# Patient Record
Sex: FEMALE | Race: WHITE | NOT HISPANIC OR LATINO | Employment: UNEMPLOYED | URBAN - METROPOLITAN AREA
[De-identification: names, ages, dates, MRNs, and addresses within clinical notes are randomized per-mention and may not be internally consistent; named-entity substitution may affect disease eponyms.]

---

## 2019-09-09 ENCOUNTER — HOSPITAL ENCOUNTER (EMERGENCY)
Facility: HOSPITAL | Age: 24
Discharge: HOME | End: 2019-09-09
Attending: EMERGENCY MEDICINE | Admitting: EMERGENCY MEDICINE

## 2019-09-09 ENCOUNTER — APPOINTMENT (OUTPATIENT)
Dept: GENERAL RADIOLOGY | Facility: HOSPITAL | Age: 24
End: 2019-09-09

## 2019-09-09 VITALS
TEMPERATURE: 98.4 F | BODY MASS INDEX: 16.83 KG/M2 | HEART RATE: 89 BPM | WEIGHT: 101 LBS | RESPIRATION RATE: 16 BRPM | DIASTOLIC BLOOD PRESSURE: 76 MMHG | HEIGHT: 65 IN | OXYGEN SATURATION: 100 % | SYSTOLIC BLOOD PRESSURE: 120 MMHG

## 2019-09-09 DIAGNOSIS — F41.0 ANXIETY ATTACK: Primary | ICD-10-CM

## 2019-09-09 LAB — S PYO AG THROAT QL: NEGATIVE

## 2019-09-09 PROCEDURE — 99284 EMERGENCY DEPT VISIT MOD MDM: CPT

## 2019-09-09 PROCEDURE — 96372 THER/PROPH/DIAG INJ SC/IM: CPT

## 2019-09-09 PROCEDURE — 93010 ELECTROCARDIOGRAM REPORT: CPT | Performed by: INTERNAL MEDICINE

## 2019-09-09 PROCEDURE — 93005 ELECTROCARDIOGRAM TRACING: CPT | Performed by: EMERGENCY MEDICINE

## 2019-09-09 PROCEDURE — 87880 STREP A ASSAY W/OPTIC: CPT | Performed by: EMERGENCY MEDICINE

## 2019-09-09 PROCEDURE — 99282 EMERGENCY DEPT VISIT SF MDM: CPT | Performed by: EMERGENCY MEDICINE

## 2019-09-09 PROCEDURE — 25010000002 LORAZEPAM PER 2 MG: Performed by: EMERGENCY MEDICINE

## 2019-09-09 PROCEDURE — 71046 X-RAY EXAM CHEST 2 VIEWS: CPT

## 2019-09-09 PROCEDURE — 87081 CULTURE SCREEN ONLY: CPT | Performed by: EMERGENCY MEDICINE

## 2019-09-09 RX ORDER — LORAZEPAM 2 MG/ML
1 INJECTION INTRAMUSCULAR ONCE
Status: COMPLETED | OUTPATIENT
Start: 2019-09-09 | End: 2019-09-09

## 2019-09-09 RX ORDER — ALPRAZOLAM 0.25 MG/1
0.5 TABLET ORAL ONCE
Status: DISCONTINUED | OUTPATIENT
Start: 2019-09-09 | End: 2019-09-09

## 2019-09-09 RX ORDER — PANTOPRAZOLE SODIUM 40 MG/1
40 TABLET, DELAYED RELEASE ORAL DAILY
COMMUNITY
End: 2022-08-04

## 2019-09-09 RX ORDER — DIVALPROEX SODIUM 125 MG/1
125 TABLET, DELAYED RELEASE ORAL 2 TIMES DAILY
COMMUNITY
End: 2022-08-04 | Stop reason: HOSPADM

## 2019-09-09 RX ORDER — LEVALBUTEROL TARTRATE 45 UG/1
1-2 AEROSOL, METERED ORAL EVERY 4 HOURS PRN
COMMUNITY
End: 2022-08-04 | Stop reason: SDUPTHER

## 2019-09-09 RX ORDER — LANSOPRAZOLE 15 MG/1
15 CAPSULE, DELAYED RELEASE ORAL DAILY
COMMUNITY
End: 2022-08-04 | Stop reason: DRUGHIGH

## 2019-09-09 RX ORDER — DESVENLAFAXINE SUCCINATE 50 MG/1
100 TABLET, EXTENDED RELEASE ORAL DAILY
COMMUNITY
End: 2022-08-04 | Stop reason: ALTCHOICE

## 2019-09-09 RX ADMIN — LORAZEPAM 1 MG: 2 INJECTION INTRAMUSCULAR; INTRAVENOUS at 10:56

## 2019-09-09 NOTE — ED NOTES
Pt requesting malox informed Dr. Natarajan pt request. No new orders noted     Hodan Greco, KUMAR  09/09/19 7007

## 2019-09-09 NOTE — ED PROVIDER NOTES
Subjective   24-year-old female with history of anxiety and panic disorder presents with chest pain difficulty taking a deep breath feels like stuff stuck in her throat since last night.  Is only able to take benzos for her anxiety.  States she is allergic to everything else.  Also having a sore throat.        History provided by:  Patient  Panic Attack   Presents for initial visit. Onset was in the past 7 days. Symptoms include chest pain, depressed mood, dizziness, excessive worry, feeling of choking, hyperventilation, insomnia, nervous/anxious behavior, panic, restlessness and shortness of breath. Patient reports no suicidal ideas. The most recent episode lasted 12 hours. The severity of symptoms is severe. Nothing aggravates the symptoms.     Her past medical history is significant for anxiety/panic attacks.       Review of Systems   Constitutional: Positive for appetite change.   HENT: Positive for sore throat and trouble swallowing.    Respiratory: Positive for shortness of breath.    Cardiovascular: Positive for chest pain.   Musculoskeletal: Negative.    Neurological: Positive for dizziness.   Psychiatric/Behavioral: Negative for suicidal ideas. The patient is nervous/anxious and has insomnia.        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   • Latex Hives   • Lexapro [Escitalopram Oxalate] Other (See Comments)     Partial paralysis   • Pertussis Vaccines Other  (See Comments)     Pt does not know what happened mother told me allergic   • Prozac [Fluoxetine Hcl] Other (See Comments)     Partial paralysis   • Rhogam [Rho (D) Immune Globulin] Other (See Comments)     tachycardia   • Risperidone Mental Status Change     Shaking, cold, freaks out   • Seroquel [Quetiapine Fumarate] Other (See Comments)     Becomes suicidal and sleeps a lot   • Tramadol Nausea And Vomiting   • Zoloft [Sertraline Hcl] Other (See Comments)     States can't remember might make me throw up   • Albuterol Anxiety     Increase heart rate       Past Surgical History:   Procedure Laterality Date   •  SECTION     • DENTAL PROCEDURE     • EAR TUBES     • TUBAL ABDOMINAL LIGATION      pt not sure       History reviewed. No pertinent family history.    Social History     Socioeconomic History   • Marital status: Single     Spouse name: Not on file   • Number of children: Not on file   • Years of education: Not on file   • Highest education level: Not on file   Tobacco Use   • Smoking status: Current Every Day Smoker     Packs/day: 1.00   Substance and Sexual Activity   • Alcohol use: Yes     Comment: socially   • Drug use: No           Objective   Physical Exam   Constitutional: She is oriented to person, place, and time. She appears well-developed.  Non-toxic appearance. She does not appear ill. She appears distressed.   HENT:   Head: Normocephalic and atraumatic.   Eyes: EOM are normal. Pupils are equal, round, and reactive to light.   Neck: Normal range of motion. Neck supple.   Cardiovascular: Normal rate, regular rhythm and normal pulses.   Pulmonary/Chest: Effort normal and breath sounds normal. She has no decreased breath sounds.   Abdominal: Soft. There is no tenderness.   Neurological: She is alert and oriented to person, place, and time.   Skin: Skin is warm and dry.   Psychiatric: Her mood appears anxious. Her speech is rapid and/or pressured. She is agitated. Cognition and memory are  normal. She expresses impulsivity. She expresses no homicidal and no suicidal ideation.   Nursing note and vitals reviewed.      Procedures           ED Course  ED Course as of Sep 09 1211   Mon Sep 09, 2019   1003 ECG 12 Lead [TP]   1210 Prior to discharge patient was feeling better.  States this is just the worst panic attack she is had a long time.  She is encouraged to follow-up with [facility].  [TP]      ED Course User Index  [TP] Daniel Natarajan MD                  [facility]    Final diagnoses:   Anxiety attack              Daniel Natarajan MD  09/09/19 1211

## 2019-09-11 LAB — BACTERIA SPEC AEROBE CULT: NORMAL

## 2019-09-25 ENCOUNTER — HOSPITAL ENCOUNTER (EMERGENCY)
Facility: HOSPITAL | Age: 24
Discharge: HOME | End: 2019-09-25
Attending: EMERGENCY MEDICINE | Admitting: EMERGENCY MEDICINE

## 2019-09-25 VITALS
HEIGHT: 63 IN | WEIGHT: 101.6 LBS | DIASTOLIC BLOOD PRESSURE: 64 MMHG | OXYGEN SATURATION: 99 % | RESPIRATION RATE: 14 BRPM | TEMPERATURE: 98 F | BODY MASS INDEX: 18 KG/M2 | HEART RATE: 110 BPM | SYSTOLIC BLOOD PRESSURE: 127 MMHG

## 2019-09-25 DIAGNOSIS — K02.9 DENTAL CARIES: Primary | ICD-10-CM

## 2019-09-25 DIAGNOSIS — R68.84 JAW PAIN: ICD-10-CM

## 2019-09-25 DIAGNOSIS — J01.00 ACUTE MAXILLARY SINUSITIS, RECURRENCE NOT SPECIFIED: ICD-10-CM

## 2019-09-25 PROCEDURE — 99152 MOD SED SAME PHYS/QHP 5/>YRS: CPT

## 2019-09-25 PROCEDURE — 99282 EMERGENCY DEPT VISIT SF MDM: CPT

## 2019-09-25 PROCEDURE — 99282 EMERGENCY DEPT VISIT SF MDM: CPT | Performed by: EMERGENCY MEDICINE

## 2019-09-25 RX ORDER — HYDROXYZINE HYDROCHLORIDE 25 MG/1
25 TABLET, FILM COATED ORAL EVERY 8 HOURS PRN
Qty: 30 TABLET | Refills: 0 | Status: SHIPPED | OUTPATIENT
Start: 2019-09-25 | End: 2022-08-04 | Stop reason: SDUPTHER

## 2019-09-25 NOTE — ED PROVIDER NOTES
EMERGENCY DEPARTMENT ENCOUNTER    Room Number:    Date seen:  2019  Time seen: 2:01 PM  PCP: Provider, No Known  Historian: Patient      HPI:  Chief Complaint: Left jaw pain, sinus congestion, cough   A complete HPI/ROS/PMH/PSH/SH/FH are unobtainable due to: nothing  Context: Aury Marquis is a 24 y.o. female who presents to the ED c/o multiple complaints.  She reports a history of TMJ disorder and reports she has been having left-sided jaw pain when she opens and closes her jaw.  She also reports she has had sinus congestion and cough.  She denies fever.  She has known poor dentition but has difficulty following up with a dentist due to financial issues and her history of  bleeding disorder.            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 No pertinent family history.      SOCIAL HISTORY  Social History     Socioeconomic History   • Marital status: Single     Spouse name: Not on file   • Number of children: Not on file   • Years of education: Not on file   • Highest education level: Not on file   Tobacco Use   • Smoking status: Current Every Day Smoker     Packs/day: 1.00   Substance and Sexual Activity   • Alcohol use: Yes     Comment: socially   • Drug use: No         ALLERGIES  Amicar [aminocaproic acid];  Buspar [buspirone]; Cephalosporins; Ketamine; Latex; Lexapro [escitalopram oxalate]; Pertussis vaccines; Prozac [fluoxetine hcl]; Rhogam [rho (d) immune globulin]; Risperidone; Seroquel [quetiapine fumarate]; Tramadol; Zoloft [sertraline hcl]; and Albuterol        REVIEW OF SYSTEMS  Review of Systems   Constitutional: Negative for fatigue.   HENT: Positive for congestion and sinus pain.    Respiratory: Positive for cough.    Gastrointestinal: Negative.    Genitourinary: Negative.    Musculoskeletal: Negative.    Skin: Negative.    Neurological: Negative.    Psychiatric/Behavioral: Negative.             PHYSICAL EXAM  ED Triage Vitals [09/25/19 1131]   Temp Heart Rate Resp BP SpO2   98 °F (36.7 °C) 110 14 127/64 99 %      Temp src Heart Rate Source Patient Position BP Location FiO2 (%)   -- -- -- -- --         GENERAL: Awake and alert, no acute distress  HENT: nares patent, mild maxillary sinus tenderness bilaterally, oropharynx is clear, she has generally poor dentition with multiple dental caries and fillings present.  The left mandibular second molar has a large cavity but no surrounding gingival swelling.  There is no sublingual edema.  There is mild point tenderness along the left angle of the mandible with no neck swelling or cervical adenopathy.  EYES: no scleral icterus  CV: regular rhythm, normal rate  RESPIRATORY: normal effort, lungs clear bilaterally, no wheezing  ABDOMEN: soft, nontender throughout  MUSCULOSKELETAL: no deformity  NEURO: alert, moves all extremities, follows commands  SKIN: warm, dry    Vital signs and nursing notes reviewed.              PROCEDURES  Procedures              MEDICATIONS GIVEN IN ER  Medications - No data to display                PROGRESS AND CONSULTS           MEDICAL DECISION MAKING    24-year-old female presents with multiple complaints.  I suspect that her left jaw pain is most likely related to her poor dentition and a dental cavity involving the left mandibular  second molar.  Given her concomitant sinusitis, she was prescribed Augmentin and advised to follow-up with her PCP and a dentist.  She also requested benzodiazepines to help manage her anxiety.  I explained her that I would not be prescribing her benzodiazepines but I offered her hydroxyzine as needed for anxiety.  She was also given Motrin as needed for pain.  Return precautions were discussed.    MDM           DIAGNOSIS  Final diagnoses:   Dental caries   Jaw pain   Acute maxillary sinusitis, recurrence not specified         DISPOSITION  Discharge            Latest Documented Vital Signs:  As of 2:01 PM  BP- 127/64 HR- 110 Temp- 98 °F (36.7 °C) O2 sat- 99%        --    Please note that portions of this were completed with a voice recognition program.          Loco Carson MD  09/25/19 7032

## 2020-04-06 ENCOUNTER — TELEPHONE (OUTPATIENT)
Dept: SURGERY | Facility: CLINIC | Age: 25
End: 2020-04-06

## 2020-05-13 ENCOUNTER — APPOINTMENT (OUTPATIENT)
Dept: LAB | Facility: HOSPITAL | Age: 25
End: 2020-05-13

## 2021-06-23 ENCOUNTER — HOSPITAL ENCOUNTER (EMERGENCY)
Facility: HOSPITAL | Age: 26
Discharge: HOME | End: 2021-06-24
Attending: EMERGENCY MEDICINE | Admitting: EMERGENCY MEDICINE

## 2021-06-23 VITALS
WEIGHT: 99.43 LBS | DIASTOLIC BLOOD PRESSURE: 72 MMHG | OXYGEN SATURATION: 99 % | HEIGHT: 63 IN | HEART RATE: 111 BPM | RESPIRATION RATE: 18 BRPM | BODY MASS INDEX: 17.62 KG/M2 | TEMPERATURE: 97.7 F | SYSTOLIC BLOOD PRESSURE: 119 MMHG

## 2021-06-23 DIAGNOSIS — K04.7 TOOTH INFECTION: ICD-10-CM

## 2021-06-23 DIAGNOSIS — K02.9 DENTAL CARIES: Primary | ICD-10-CM

## 2021-06-23 PROCEDURE — 99282 EMERGENCY DEPT VISIT SF MDM: CPT

## 2021-06-23 RX ORDER — PENICILLIN V POTASSIUM 500 MG/1
500 TABLET ORAL 4 TIMES DAILY
Qty: 40 TABLET | Refills: 0 | Status: SHIPPED | OUTPATIENT
Start: 2021-06-23 | End: 2021-07-03

## 2021-06-23 RX ORDER — KETOROLAC TROMETHAMINE 30 MG/ML
30 INJECTION, SOLUTION INTRAMUSCULAR; INTRAVENOUS ONCE
Status: COMPLETED | OUTPATIENT
Start: 2021-06-23 | End: 2021-06-24

## 2021-06-24 PROCEDURE — 96372 THER/PROPH/DIAG INJ SC/IM: CPT

## 2021-06-24 PROCEDURE — 25010000002 KETOROLAC TROMETHAMINE PER 15 MG: Performed by: PHYSICIAN ASSISTANT

## 2021-06-24 RX ADMIN — KETOROLAC TROMETHAMINE 30 MG: 30 INJECTION, SOLUTION INTRAMUSCULAR at 00:18

## 2022-08-04 ENCOUNTER — OFFICE VISIT (OUTPATIENT)
Dept: FAMILY MEDICINE CLINIC | Facility: CLINIC | Age: 27
End: 2022-08-04

## 2022-08-04 VITALS
BODY MASS INDEX: 18.89 KG/M2 | WEIGHT: 106.6 LBS | HEIGHT: 63 IN | DIASTOLIC BLOOD PRESSURE: 70 MMHG | HEART RATE: 86 BPM | SYSTOLIC BLOOD PRESSURE: 108 MMHG | OXYGEN SATURATION: 99 %

## 2022-08-04 DIAGNOSIS — M54.41 CHRONIC BILATERAL LOW BACK PAIN WITH BILATERAL SCIATICA: ICD-10-CM

## 2022-08-04 DIAGNOSIS — M54.42 CHRONIC BILATERAL LOW BACK PAIN WITH BILATERAL SCIATICA: ICD-10-CM

## 2022-08-04 DIAGNOSIS — G89.29 CHRONIC BILATERAL LOW BACK PAIN WITH BILATERAL SCIATICA: ICD-10-CM

## 2022-08-04 DIAGNOSIS — F41.9 ANXIETY: Primary | ICD-10-CM

## 2022-08-04 DIAGNOSIS — M51.36 DDD (DEGENERATIVE DISC DISEASE), LUMBAR: ICD-10-CM

## 2022-08-04 DIAGNOSIS — J06.9 ACUTE URI: ICD-10-CM

## 2022-08-04 DIAGNOSIS — F32.A DEPRESSION, UNSPECIFIED DEPRESSION TYPE: ICD-10-CM

## 2022-08-04 PROCEDURE — 99204 OFFICE O/P NEW MOD 45 MIN: CPT | Performed by: FAMILY MEDICINE

## 2022-08-04 RX ORDER — AMOXICILLIN 500 MG/1
500 CAPSULE ORAL 3 TIMES DAILY
COMMUNITY
Start: 2022-07-28 | End: 2022-11-07

## 2022-08-04 RX ORDER — VENLAFAXINE 37.5 MG/1
37.5 TABLET ORAL 2 TIMES DAILY
Qty: 60 TABLET | Refills: 0 | Status: SHIPPED | OUTPATIENT
Start: 2022-08-04

## 2022-08-04 RX ORDER — HYDROXYZINE HYDROCHLORIDE 25 MG/1
25 TABLET, FILM COATED ORAL EVERY 8 HOURS PRN
Qty: 30 TABLET | Refills: 0 | Status: SHIPPED | OUTPATIENT
Start: 2022-08-04 | End: 2022-09-13 | Stop reason: ALTCHOICE

## 2022-08-04 RX ORDER — LANSOPRAZOLE 30 MG/1
30 CAPSULE, DELAYED RELEASE ORAL DAILY
Qty: 90 CAPSULE | Refills: 3 | Status: SHIPPED | OUTPATIENT
Start: 2022-08-04

## 2022-08-04 RX ORDER — LORATADINE 10 MG/1
10 TABLET, ORALLY DISINTEGRATING ORAL DAILY
Qty: 90 TABLET | Refills: 3 | Status: SHIPPED | OUTPATIENT
Start: 2022-08-04

## 2022-08-04 RX ORDER — BENZONATATE 100 MG/1
100 CAPSULE ORAL 3 TIMES DAILY PRN
Qty: 30 CAPSULE | Refills: 0 | Status: SHIPPED | OUTPATIENT
Start: 2022-08-04 | End: 2022-08-14

## 2022-08-04 RX ORDER — LEVALBUTEROL TARTRATE 45 UG/1
1-2 AEROSOL, METERED ORAL EVERY 4 HOURS PRN
Qty: 15 G | Refills: 11 | Status: SHIPPED | OUTPATIENT
Start: 2022-08-04 | End: 2022-09-13 | Stop reason: SDUPTHER

## 2022-08-04 RX ORDER — DIVALPROEX SODIUM 125 MG/1
125 CAPSULE, COATED PELLETS ORAL 2 TIMES DAILY
Qty: 60 CAPSULE | Refills: 0 | Status: SHIPPED | OUTPATIENT
Start: 2022-08-04 | End: 2022-10-30 | Stop reason: SDUPTHER

## 2022-08-04 NOTE — PROGRESS NOTES
New Patient Office Visit      Patient Name: Aury Marquis  : 1995   MRN: [id number]     Chief Complaint:    Chief Complaint   Patient presents with   • Establish Care   • Anxiety       Subjective   History of Present Illness    History of Present Illness: Aury Marquis is a 27 y.o. female who is here today to establish care.    Previous primary care: Dr. Maikel Garcia, [place]    Chronic health conditions:  Anxiety: Currently buying Xanax off the street to treat anxiety. Was seeing therapist/psychiatrist at [facility] up until ~2021.  Choking phobia: ~Since age 4, has been worked up and did speech therapy previously  Patient reports being on benzodiazepines since the age of 4. She has not seen a psychiatrist since last , so she has been buying alprazolam on the streets. She is currently out of alprazolam. She reports previously being prescribed clonazepam, valproic acid, and paroxetine. She also notes history of taking does in the fact seen, which was the most helpful medication. However, she can no longer take this medication because she cannot swallow the pill. Patient says group therapy has been beneficial in the past, but individual therapy has not. She has not regularly seen a therapist in several months, but did have an intake appointment with Melchor Bingham in [facility] recently. Patient does not have another appointment scheduled, due to transportation issues. Patient would like to be admitted to [facility] for treatment of anxiety via medications in group therapy. She notes she specifically presented to [facility], because she saw online that we could accommodate her dietary needs.  Alcohol use disorder: Drinking ~fifth per day self-medicating    The patient is accompanied by an adult male.    The patient is a 27-year-old female who presents today to establish care. She has numerous primary care providers listed on her chart including Dr. Paulino Ko and SREEKANTH Del Castillo,  "and she appears to have an appointment with Marco A Lam D.O. at [facility] on 09/01/2022.    The patient reports that her anxiety is \"out of control.\" She is no longer taking her medications. The last physician that she saw was Dr. Ko and he had prescribed her clonazepam. She reports that she would talk to him every 2 weeks, and he would send her medication up here. She claims that he never informed her that she had to do blood work. She reports that she was told that if she did not come in for bloodwork that day, he would no longer prescribe the medication.    The patient reports that she has had a cough for the past 2 weeks for which she uses NyQuil. She states that she was put on amoxicillin, which she is still administering.     The patient reports that she has a swallowing phobia. She reports that most of the food that she eats, she does not swallow. She reports that her anxiety has gotten to the point where a couple of weeks ago she was not eating anything or drinking even water, as she was choking on water, and she has been experiencing this since she was approximately 4 years old. She reports that she has had swallow studies in the past, and she has seen a speech therapist. She informs us that every time something traumatic happens, \"her body just loses it.\" Recently she lost 2 grandmothers in 01/2022 and 03/2022. She reports that she visits the emergency room often for her panic attacks. She reports that every time she goes to the emergency room, they give her Xanax, and it calms her down for a couple of hours. She reports that she has been breaking the Xanax into quarters so that she can make it through the day. She reports that she drinks alcohol way too much, and she does not want to drink alcohol. The adult male reports that some days she might drink a whole fifth plus a 6-pack, and some days she might drink 4 tall boys. The patient reports that she was never an alcoholic. She reports " that she was perfectly fine when she had the clonazepam. She reports that she used to be on Depakote and Pristiq when she could swallow a tablet; however, she is allergic to Pristiq. She reports that she can do liquid medication and medication that she can crush into applesauce. The patient reports that she was administering Suboxone 2 years ago. The last time the patient used pain medication was approximately 1 month ago for her teeth. She claims that all of her teeth need to be removed.    The patient reports that she has scoliosis, which causes her significant pain. The patient's last MRI was in 03/2021. She did have a lumbar spine CT performed in 05/2022.    The patient also claims to have gastroesophageal reflux which is under control. She had been taking multiple medications for her gastroesophageal reflux, and Prevacid is the only one that worked.    The patient is requesting dissolvable Claritin. She does use Xopenex inhaler. She claims that she cannot take albuterol because it makes her jittery.    The patient reports that approximately 4 weeks ago, her fingernails were yellow. She said that they went back to normal now that she is eating again.    Other physicians currently involved in patient's care:  Patient Care Team:  Crescencio Haines DO as PCP - General (Family Medicine)  Provider, No Known as PCP - Family Medicine  Beck Ybarra APRN as Referring Physician (Family Medicine)  Johnson Burton MD PhD as Consulting Physician (Hematology and Oncology)      This patient is accompanied by their self who contributes to the history of their care.    The following portions of the patient's history were reviewed and updated as appropriate: allergies, current medications, past family history, past medical history, past social history, past surgical history and problem list.    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    • Central nervous system dysfunction in     • Depression    • GERD (gastroesophageal reflux disease)    • Gum disease    • Panic disorder    • Scoliosis    • Tattoos    • Von Willebrand disease (HCC)    • Von Willebrand disease (HCC)        Past Surgical History:   Past Surgical History:   Procedure Laterality Date   •  SECTION     • DENTAL PROCEDURE     • EAR TUBES     • TUBAL ABDOMINAL LIGATION      pt not sure       Family History:   Family History   Problem Relation Age of Onset   • Hypertension Mother    • Stroke Mother    • Cancer Maternal Grandfather    • Ovarian cancer Paternal Grandmother    • Hypertension Paternal Grandfather        Social History:   Social History     Socioeconomic History   • Marital status: Single   • Number of children: 3   • Years of education: High school   Tobacco Use   • Smoking status: Current Every Day Smoker     Packs/day: 1.00     Years: 6.00     Pack years: 6.00     Types: Cigarettes   • Smokeless tobacco: Never Used   Substance and Sexual Activity   • Alcohol use: Yes     Comment: daily   • Drug use: No   • Sexual activity: Defer       Tobacco History:   Social History     Tobacco Use   Smoking Status Current Every Day Smoker   • Packs/day: 1.00   • Years: 6.   • Pack years: 6.00   • Types: Cigarettes   Smokeless Tobacco Never Used       Medications:     Current Outpatient Medications:   •  amoxicillin (AMOXIL) 500 MG capsule, Take 500 mg by mouth 3 (Three) Times a Day., Disp: , Rfl:   •  hydrOXYzine (ATARAX) 25 MG tablet, Take 1 tablet by mouth Every 8 (Eight) Hours As Needed for Anxiety., Disp: 30 tablet, Rfl: 0  •  ibuprofen (ADVIL,MOTRIN) 100 MG/5ML suspension, Take 20 mL by mouth Every 6 (Six) Hours As Needed for Mild Pain ., Disp: 200 mL, Rfl: 0  •  levalbuterol (XOPENEX HFA)  "45 MCG/ACT inhaler, Inhale 1-2 puffs Every 4 (Four) Hours As Needed for Wheezing., Disp: 15 g, Rfl: 11  •  benzonatate (TESSALON) 100 MG capsule, Take 1 capsule by mouth 3 (Three) Times a Day As Needed for Cough for up to 10 days., Disp: 30 capsule, Rfl: 0  •  Divalproex Sodium (Depakote Sprinkles) 125 MG capsule, Take 1 capsule by mouth 2 (Two) Times a Day., Disp: 60 capsule, Rfl: 0  •  lansoprazole (Prevacid) 30 MG capsule, Take 1 capsule by mouth Daily., Disp: 90 capsule, Rfl: 3  •  loratadine (Claritin Reditabs) 10 MG disintegrating tablet, Place 1 tablet on the tongue Daily., Disp: 90 tablet, Rfl: 3  •  venlafaxine (EFFEXOR) 37.5 MG tablet, Take 1 tablet by mouth 2 (Two) Times a Day., Disp: 60 tablet, Rfl: 0    Allergies:   Allergies   Allergen Reactions   • Contrast Dye Shortness Of Breath   • Amicar [Aminocaproic Acid] Nausea And Vomiting     Happened as a baby, mother states I'm allergic   • Buspar [Buspirone] Hallucinations   • Cephalosporins Hives   • Ketamine Other (See Comments)     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   • Latex Hives   • Lexapro [Escitalopram Oxalate] Other (See Comments)     Partial paralysis   • Morphine Itching   • Pertussis Vaccines Other (See Comments)     Pt does not know what happened mother told me allergic   • Prozac [Fluoxetine Hcl] Other (See Comments)     Partial paralysis   • Rhogam [Rho (D) Immune Globulin] Other (See Comments)     tachycardia   • Risperidone Mental Status Change     Shaking, cold, freaks out   • Seroquel [Quetiapine Fumarate] Other (See Comments)     Becomes suicidal and sleeps a lot   • Tramadol Nausea And Vomiting   • Zoloft [Sertraline Hcl] Other (See Comments)     States can't remember might make me throw up   • Albuterol Anxiety     Increase heart rate       Objective   Objective     Physical Exam:  Vital Signs:   Vitals:    08/04/22 1601   BP: 108/70   Pulse: 86   SpO2: 99%   Weight: 48.4 kg (106 lb 9.6 oz)   Height: 160 cm (62.99\") "     Body mass index is 18.89 kg/m².     Physical Exam  Nursing note reviewed  Const: NAD, A&Ox4, Pleasant, Cooperative  Eyes: EOMI, no conjunctivitis  ENT: No nasal discharge present, neck supple  Cardiac: Regular rate and rhythm, no cyanosis  Resp: Respiratory rate within normal limits, no increased work of breathing, no audible wheezing or retractions noted  GI: No distention or ascites  MSK: Motor and sensation grossly intact in bilateral upper extremities  Neurologic: CN II-XII grossly intact  Psych: Appropriate mood and behavior.  Skin: Warm, dry  Procedures/Radiology     Procedures  No radiology results for the last 7 days     Assessment & Plan   Assessment / Plan      Assessment/Plan:   Problems Addressed This Visit  Diagnoses and all orders for this visit:    1. Anxiety (Primary)  -     Ambulatory Referral to Psychiatry  -     venlafaxine (EFFEXOR) 37.5 MG tablet; Take 1 tablet by mouth 2 (Two) Times a Day.  Dispense: 60 tablet; Refill: 0  -     Divalproex Sodium (Depakote Sprinkles) 125 MG capsule; Take 1 capsule by mouth 2 (Two) Times a Day.  Dispense: 60 capsule; Refill: 0  -     hydrOXYzine (ATARAX) 25 MG tablet; Take 1 tablet by mouth Every 8 (Eight) Hours As Needed for Anxiety.  Dispense: 30 tablet; Refill: 0    2. Acute URI    3. Depression, unspecified depression type  -     venlafaxine (EFFEXOR) 37.5 MG tablet; Take 1 tablet by mouth 2 (Two) Times a Day.  Dispense: 60 tablet; Refill: 0  -     Divalproex Sodium (Depakote Sprinkles) 125 MG capsule; Take 1 capsule by mouth 2 (Two) Times a Day.  Dispense: 60 capsule; Refill: 0    4. Chronic bilateral low back pain with bilateral sciatica  -     Ambulatory Referral to Pain Management    5. DDD (degenerative disc disease), lumbar  -     Ambulatory Referral to Pain Management    Other orders  -     ibuprofen (ADVIL,MOTRIN) 100 MG/5ML suspension; Take 20 mL by mouth Every 6 (Six) Hours As Needed for Mild Pain .  Dispense: 200 mL; Refill: 0  -      lansoprazole (Prevacid) 30 MG capsule; Take 1 capsule by mouth Daily.  Dispense: 90 capsule; Refill: 3  -     loratadine (Claritin Reditabs) 10 MG disintegrating tablet; Place 1 tablet on the tongue Daily.  Dispense: 90 tablet; Refill: 3  -     levalbuterol (XOPENEX HFA) 45 MCG/ACT inhaler; Inhale 1-2 puffs Every 4 (Four) Hours As Needed for Wheezing.  Dispense: 15 g; Refill: 11  -     benzonatate (TESSALON) 100 MG capsule; Take 1 capsule by mouth 3 (Three) Times a Day As Needed for Cough for up to 10 days.  Dispense: 30 capsule; Refill: 0    1. Anxiety  - Severe, with multiple phobias and panic attacks. She was previously on clonazepam and currently buying Xanax off the street and self-medicating with a 5th of alcohol per day. Obviously, I recommended she avoid illicit use of benzodiazepines and reduce her alcohol use slowly over the next few weeks. I will get her back on an anxiety medication that she is able to crush (the Pristiq was unable to be crushed or opened, so we will put her on venlafaxine 37.5 mg twice daily) along with Depakote sprinkles. I have placed a referral to psychiatry in the meantime.    2. Asthma.  - She cannot tolerate albuterol due to panic attacks. I recommend Xopenex prescription sent. She should also continue Claritin daily.    3. Swallowing phobia.  - She has previously been worked up and went to speech therapy. It seems to be certainly worsened by her anxiety and going untreated.    4. Chronic low back pain.  - She has severe scoliosis and low back pain. MRI from 03/2021 was fairly benign with a very slight disc herniation at S1, but open foramina and mild, at most degenerative disc disease. Her pain is remarked to be certainly out of proportion to MRI findings, and with her history of multiple substance abuse, I would certainly not feel comfortable prescribing controlled substances. She requested a referral to pain management, and this was provided for her today.  Problem List Items  Addressed This Visit        Mental Health    Anxiety - Primary    Relevant Medications    venlafaxine (EFFEXOR) 37.5 MG tablet    Divalproex Sodium (Depakote Sprinkles) 125 MG capsule    hydrOXYzine (ATARAX) 25 MG tablet    Other Relevant Orders    Ambulatory Referral to Psychiatry (Completed)      Other Visit Diagnoses     Acute URI        Relevant Medications    amoxicillin (AMOXIL) 500 MG capsule    Depression, unspecified depression type        Relevant Medications    venlafaxine (EFFEXOR) 37.5 MG tablet    Divalproex Sodium (Depakote Sprinkles) 125 MG capsule    hydrOXYzine (ATARAX) 25 MG tablet    Chronic bilateral low back pain with bilateral sciatica        Relevant Orders    Ambulatory Referral to Pain Management (Completed)    DDD (degenerative disc disease), lumbar        Relevant Orders    Ambulatory Referral to Pain Management (Completed)          We will plan to obtain previous records both for chronic preventative care as well as those related to the current episode of care.  Any records that the patient brought with her today were reviewed personally by me during the visit today and will be scanned into the chart for posterity.    Discussed the nature of the disease including relevant anatomy & expected clinical course, risks, complications, implications, management, safe and proper use of medications. Plan of care reviewed with patient at the conclusion of today's visit. Education was provided regarding diagnosis and management.  Patient verbalizes understanding of and agreement with management plan. Encouraged therapeutic lifestyle changes including low calorie diet with plenty of fruits and vegetables, daily exercise, medication compliance, and keeping scheduled follow up appointments with me and any other providers. Encouraged patient to have appointment for complete physical, fasting labs, appropriate screenings, and immunizations on an annual basis. Discussed extended office hours, shared call,  and appropriate use of the ER. Discussed generally we do not prescribe chronic controlled substances from this office. Appropriate referrals will be made to pain management and psychiatry if needed. Stressed the importance and expectation of medical compliance with plan of care, medications, and follow up appointments.    There are no Patient Instructions on file for this visit.    Follow Up:   Return in about 4 weeks (around 9/1/2022).    DO RELL Feliz  [address]  [facility]  [address]  [facility] [address]  Fax [phone]  Phone [phone]    Transcribed from ambient dictation for Crescencio Haines DO by LISBETH GOOD.  08/04/22   19:01 EDT    Patient verbalized consent to the visit recording.  I have personally performed the services described in this document as transcribed by the above individual, and it is both accurate and complete.  Crescencio Haines DO  8/5/2022  12:31 EDT

## 2022-08-08 ENCOUNTER — TELEPHONE (OUTPATIENT)
Dept: FAMILY MEDICINE CLINIC | Facility: CLINIC | Age: 27
End: 2022-08-08

## 2022-08-08 ENCOUNTER — PRIOR AUTHORIZATION (OUTPATIENT)
Dept: FAMILY MEDICINE CLINIC | Facility: CLINIC | Age: 27
End: 2022-08-08

## 2022-08-08 NOTE — TELEPHONE ENCOUNTER
Caller: Aury Marquis    Relationship to patient: Self    Best call back number: [phone]    Patient is needing: PATIENT CALLED AND STATED THAT THERE ARE 2 PRESCRIPTIONS THAT SEEMED TO HAVE BEEN FORGOTTEN TO BE CALLED IN. CLARITIN DISSOLVABLE TABLETS AND XOPENEX INHALER. PATIENT ALSO REQUESTS A LARGER QUANTITY PRESCRIPTION OF IBUPROFEN IF POSSIBLE.

## 2022-08-08 NOTE — TELEPHONE ENCOUNTER
Completed Prior Auth for Loratadine     KEY:  [id number]  Awaiting determination from [employer].

## 2022-08-08 NOTE — TELEPHONE ENCOUNTER
Rx have been received, inhaler and claritin require PA. This has been submitted. Patient notified.

## 2022-08-12 ENCOUNTER — TELEPHONE (OUTPATIENT)
Dept: FAMILY MEDICINE CLINIC | Facility: CLINIC | Age: 27
End: 2022-08-12

## 2022-08-12 DIAGNOSIS — F32.A DEPRESSION, UNSPECIFIED DEPRESSION TYPE: ICD-10-CM

## 2022-08-12 DIAGNOSIS — F41.9 ANXIETY: Primary | ICD-10-CM

## 2022-08-12 RX ORDER — LEVALBUTEROL TARTRATE 45 UG/1
1-2 AEROSOL, METERED ORAL EVERY 4 HOURS PRN
Qty: 15 G | Refills: 11 | OUTPATIENT
Start: 2022-08-12

## 2022-08-12 RX ORDER — LORATADINE 10 MG/1
10 TABLET, ORALLY DISINTEGRATING ORAL DAILY
Qty: 90 TABLET | Refills: 3 | OUTPATIENT
Start: 2022-08-12

## 2022-08-12 NOTE — TELEPHONE ENCOUNTER
Caller: MEGHA MICHAEL    Relationship: Emergency Contact    Best call back number: [phone]    What is the medical concern/diagnosis: NERVE MEDICATION    What specialty or service is being requested: [facility]    PATIENT IS REQUESTING REFERRAL, THEY HAVE APPOINTMENT NEXT MONTH  09/13/22 AT NOON

## 2022-08-12 NOTE — TELEPHONE ENCOUNTER
Caller: MEGHA MICHAEL    Relationship: Emergency Contact    Best call back number: [phone] ([phone]    Requested Prescriptions:   Requested Prescriptions     Pending Prescriptions Disp Refills   • loratadine (Claritin Reditabs) 10 MG disintegrating tablet 90 tablet 3     Sig: Place 1 tablet on the tongue Daily.   • levalbuterol (XOPENEX HFA) 45 MCG/ACT inhaler 15 g 11     Sig: Inhale 1-2 puffs Every 4 (Four) Hours As Needed for Wheezing.        Pharmacy where request should be sent: [facility] [address] [place] [address] [phone] [facility] [phone]      Additional details provided by patient: PHARMACY STATED THEY DO NOT HAVE THOSE PRESCRIPTIONS- PATIENT HAS TO HAVE THE NAME BRAND XOPENEX DUE TO SIDE EFFECTS.     ALSO PATIENT IS REQUESTING LIQUID Z-PACK DUE TO GREEN MUCUS, LIQUID PREDNISONE DUE TO COUGH AND CHEST CONGESTION AND DISSOLVABLE  ZOFRAN FOR NAUSEA     Does the patient have less than a 3 day supply:  [x] Yes  [] No    Anna Frederick Rep   08/12/22 10:08 EDT

## 2022-08-12 NOTE — TELEPHONE ENCOUNTER
Caller: MEGHA MICHAEL    Relationship: Emergency Contact    Best call back number [phone] (M)    PATIENT AND HER  CALLED TO CHECK THE STATUS OF THE REFERRAL [facility] THAT WAS PLACED ON 08/04/22.     PATIENT IS HAVING EXTREME ANXIETY AND DUE TO THE ANXIETY SHE IS UNABLE TO KEEP DOWN FOOD AND LIQUID    PATIENT HAS A CHOCKING PHOBIA     NO ONE HAS CONTACTED HER FROM [facility]

## 2022-08-23 ENCOUNTER — TELEPHONE (OUTPATIENT)
Dept: FAMILY MEDICINE CLINIC | Facility: CLINIC | Age: 27
End: 2022-08-23

## 2022-08-29 PROBLEM — F60.3 BORDERLINE PERSONALITY DISORDER (HCC): Status: ACTIVE | Noted: 2019-12-16

## 2022-08-29 PROBLEM — J30.2 SEASONAL ALLERGIES: Status: ACTIVE | Noted: 2019-12-16

## 2022-08-29 PROBLEM — F13.20 BENZODIAZEPINE DEPENDENCE (HCC): Status: ACTIVE | Noted: 2019-12-16

## 2022-08-29 PROBLEM — F11.20 OPIATE DEPENDENCE, CONTINUOUS: Status: ACTIVE | Noted: 2019-12-16

## 2022-08-29 PROBLEM — K21.9 GASTROESOPHAGEAL REFLUX DISEASE WITHOUT ESOPHAGITIS: Status: ACTIVE | Noted: 2019-12-16

## 2022-08-29 PROBLEM — J45.20 MILD INTERMITTENT ASTHMA WITHOUT COMPLICATION: Status: ACTIVE | Noted: 2021-01-21

## 2022-08-29 PROBLEM — F39 MOOD DISORDER: Status: ACTIVE | Noted: 2019-12-16

## 2022-09-12 ENCOUNTER — TELEPHONE (OUTPATIENT)
Dept: FAMILY MEDICINE CLINIC | Facility: CLINIC | Age: 27
End: 2022-09-12

## 2022-09-12 NOTE — TELEPHONE ENCOUNTER
Hub staff attempted to follow warm transfer process and was unsuccessful     Caller: MEGHA MICHAEL    Relationship to patient: Emergency Contact    Best call back number: [phone]    Patient is needing: THE CALLER STATES THAT THE PATIENT HAS A VIRTUAL APPOINTMENT AT 4:30 PM TODAY AND CAN NOT GET INTO MYCHART PLEASE CALL PATIENT

## 2022-09-13 ENCOUNTER — TELEMEDICINE (OUTPATIENT)
Dept: FAMILY MEDICINE CLINIC | Facility: CLINIC | Age: 27
End: 2022-09-13

## 2022-09-13 DIAGNOSIS — J06.9 ACUTE URI: Primary | ICD-10-CM

## 2022-09-13 DIAGNOSIS — F41.9 ANXIETY: ICD-10-CM

## 2022-09-13 PROCEDURE — 99214 OFFICE O/P EST MOD 30 MIN: CPT | Performed by: FAMILY MEDICINE

## 2022-09-13 RX ORDER — AMOXICILLIN AND CLAVULANATE POTASSIUM 250; 62.5 MG/5ML; MG/5ML
500 POWDER, FOR SUSPENSION ORAL 2 TIMES DAILY
Qty: 200 ML | Refills: 0 | Status: SHIPPED | OUTPATIENT
Start: 2022-09-13 | End: 2022-09-23

## 2022-09-13 RX ORDER — HYDROXYZINE HCL 10 MG/5 ML
50 SOLUTION, ORAL ORAL 3 TIMES DAILY
Qty: 2250 ML | Refills: 11 | Status: SHIPPED | OUTPATIENT
Start: 2022-09-13

## 2022-09-13 RX ORDER — LEVALBUTEROL TARTRATE 45 UG/1
1-2 AEROSOL, METERED ORAL EVERY 4 HOURS PRN
Qty: 15 G | Refills: 11 | Status: SHIPPED | OUTPATIENT
Start: 2022-09-13

## 2022-09-13 NOTE — PROGRESS NOTES
Subjective   Aury Marquis is a 27 y.o. female.     Chief Complaint   Patient presents with   • Follow-up   • URI       History of Present Illness     Aury Marquis presents today for   Chief Complaint   Patient presents with   • Follow-up   • URI       Follow up from  ER for viral URI. Pt reports that she has been sick x2 weeks and has only gotten progressively worse. Pt also notes that she has a new onset of green mucous with the cough in the past few days. Hx of asthma. Pt also notes chest tightness from the coughing, dehydration, loss of appetite, and shortness of breath. Pt also endorses N/V/D.   Patient denies fever, chills, congestion, body aches, dizziness, light headedness, urinary symptoms, changes in bowels, SOA, and chest pain.    Took a course of azithromycin. Not any better. Jaw is swollen from dental issues.    Saw Wild at [facility] regarding anxiety.    You have chosen to receive care through a telehealth visit.  Do you consent to use a video/audio connection for your medical care today? Yes    The following portions of the patient's history were reviewed and updated as appropriate: allergies, current medications, past family history, past medical history, past social history, past surgical history and problem list.    Active Ambulatory Problems     Diagnosis Date Noted   • Anxiety 08/04/2022   • Seasonal allergies 12/16/2019   • Post traumatic stress disorder (PTSD) 08/02/2016   • Generalized anxiety disorder 12/11/2014   • Opiate dependence, continuous ([facility]) 12/16/2019   • Mood disorder ([facility]) 12/16/2019   • Mild intermittent asthma without complication 01/21/2021   • Gastroesophageal reflux disease without esophagitis 12/16/2019   • Borderline personality disorder ([facility]) 12/16/2019   • Von Willebrand disease ([facility]) 12/11/2014   • Benzodiazepine dependence ([facility]) 12/16/2019   • Asthma 12/11/2014   • Agoraphobia with panic attacks 12/11/2014     Resolved Ambulatory Problems     Diagnosis Date  Noted   • No Resolved Ambulatory Problems     Past Medical History:   Diagnosis Date   • Adjustment reaction    • 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    • Central nervous system dysfunction in     • Depression    • GERD (gastroesophageal reflux disease)    • Gum disease    • Panic disorder    • Scoliosis    • Tattoos      Past Surgical History:   Procedure Laterality Date   •  SECTION     • DENTAL PROCEDURE     • EAR TUBES     • TUBAL ABDOMINAL LIGATION      pt not sure     Family History   Problem Relation Age of Onset   • Hypertension Mother    • Stroke Mother    • Cancer Maternal Grandfather    • Ovarian cancer Paternal Grandmother    • Hypertension Paternal Grandfather      Social History     Socioeconomic History   • Marital status: Single   • Number of children: 3   • Years of education: High school   Tobacco Use   • Smoking status: Current Every Day Smoker     Packs/day: 1.00     Years: 6.00     Pack years: 6.00     Types: Cigarettes   • Smokeless tobacco: Never Used   Substance and Sexual Activity   • Alcohol use: Yes     Comment: daily   • Drug use: No   • Sexual activity: Defer       Review of Systems  Review of Systems -  General ROS: negative for - chills, fever or night sweats  Cardiovascular ROS: no chest pain or dyspnea on exertion  Gastrointestinal ROS: no abdominal pain, change in bowel habits, or black or bloody stools  Genito-Urinary ROS: no dysuria, trouble voiding, or hematuria    Objective   There were no vitals taken for this visit.  Vitals obtained from patient if available  Physical Exam  Const: Non-toxic appearing, NAD, A&Ox4, Pleasant, Cooperative  Eyes: EOMI, no conjunctivitis  ENT: No copious nasal drainage noted  Cardiac: Regular rate by pulse  Resp: Respiratory rate observed to be within normal limits, no increased work of breathing observed, no audible wheezing or cough noted  Psych: Appropriate mood and behavior.  Procedures  Assessment &  Plan   Problem List Items Addressed This Visit        Mental Health    Anxiety    Relevant Medications    hydrOXYzine (ATARAX) 10 MG/5ML syrup    Cariprazine HCl (Vraylar) 3 MG capsule capsule      Other Visit Diagnoses     Acute URI    -  Primary          See patient diagnoses and orders along with patient instructions for assessment, plan, and changes to care for patient.    This visit was conducted via telemedicine with live video and audio provided through Video Options: MyChart/Zoom at the point of care.    There are no Patient Instructions on file for this visit.    No follow-ups on file.    Ambulatory progress note signed and attested to by Crescencio Haines D.O.

## 2022-09-30 ENCOUNTER — PATIENT MESSAGE (OUTPATIENT)
Dept: FAMILY MEDICINE CLINIC | Facility: CLINIC | Age: 27
End: 2022-09-30

## 2022-10-18 ENCOUNTER — TELEPHONE (OUTPATIENT)
Dept: FAMILY MEDICINE CLINIC | Facility: CLINIC | Age: 27
End: 2022-10-18

## 2022-10-18 NOTE — TELEPHONE ENCOUNTER
Caller: MEGHA PAT    Relationship: Spouse    Best call back number: [phone] OR MOTHER WILBERT ROCHA [phone]    What is the best time to reach you: ANY TIME    Who are you requesting to speak with (clinical staff, provider,  specific staff member): DR CONTRERAS    Do you know the name of the person who called: MEGHA    What was the call regarding: PATIENT IS IN THE [facility] AND THEY NEED DR CONTRERAS TO CALL THEM TO GIVE THEM WHAT MEDICATIONS PATIENT IS ON AND NEEDING . PLEASE CALL [phone] THEY NEED TO HEAR FROM DR CONTRERAS ASA. ANA NAME IS DARLINE MARTINEZ.  Do you require a callback: YES

## 2022-10-18 NOTE — TELEPHONE ENCOUNTER
Father requested that we contact the intermediate nurse to discuss current medications, and explain her agoraphobia and inability to swallow pills.     Contacted the longterm center, the  & nurse have left for the evening. Will call back in the morning, when they return

## 2022-10-19 NOTE — TELEPHONE ENCOUNTER
Spoke with facility nurse to discuss patient's medical conditions. She requested a copy of current meds, and allergy list faxed to their office.       [facility]  [phone] fax

## 2022-10-24 NOTE — TELEPHONE ENCOUNTER
Rx Refill Note  Requested Prescriptions     Pending Prescriptions Disp Refills   • ibuprofen (ADVIL,MOTRIN) 100 MG/5ML suspension 200 mL 0     Sig: Take 20 mL by mouth Every 6 (Six) Hours As Needed for Mild Pain.      Last office visit with prescribing clinician: 8/4/2022      Next office visit with prescribing clinician: Visit date not found            Renae Garrison MA  10/24/22, 14:41 EDT

## 2022-10-24 NOTE — TELEPHONE ENCOUNTER
Incoming Refill Request      Medication requested (name and dose): ibuprofen (ADVIL,MOTRIN) 100 MG/5ML suspension    Pharm[facility] where request should be sent[facility]    Additional details provided by patient: PATIENT IS OUT OF THE MEDICATION    Best call back number: [phone]    Does the patient have less than a 3 day supply:  [x] Yes  [] No    ALSO, WOULD LIKE TO SEE IF SHE COULD GET A Z-PACK AS HER TEETH ARE INFECTED.  SHE MADE AN APPOINTMENT FOR 11/5/22 TO GET HER MEDICATIONS REFILLED AND MENTAL HEALTH ISSUES AS Saturday WAS THE ONLY TIME SHE COULD COME IN.    Anna Andrade Rep  10/24/22, 14:25 EDT

## 2022-10-25 NOTE — TELEPHONE ENCOUNTER
Rx Refill Note  Requested Prescriptions     Signed Prescriptions Disp Refills   • ibuprofen (ADVIL,MOTRIN) 100 MG/5ML suspension 200 mL 0     Sig: Take 20 mL by mouth Every 6 (Six) Hours As Needed for Mild Pain.     Authorizing Provider: STAR CONTRERAS     Ordering User: AISHWARYA CALDERÓN      Last office visit with prescribing clinician: 9/132022      Next office visit with prescribing clinician: 11/05/22           Aishwarya Calderón MA  10/25/22, 14:16 EDT

## 2022-10-30 DIAGNOSIS — F32.A DEPRESSION, UNSPECIFIED DEPRESSION TYPE: ICD-10-CM

## 2022-10-30 DIAGNOSIS — F41.9 ANXIETY: ICD-10-CM

## 2022-10-31 RX ORDER — DIVALPROEX SODIUM 125 MG/1
125 CAPSULE, COATED PELLETS ORAL 2 TIMES DAILY
Qty: 60 CAPSULE | Refills: 0 | Status: SHIPPED | OUTPATIENT
Start: 2022-10-31

## 2022-10-31 RX ORDER — LORATADINE 10 MG/1
10 TABLET, ORALLY DISINTEGRATING ORAL DAILY
Qty: 90 TABLET | Refills: 3 | OUTPATIENT
Start: 2022-10-31

## 2022-11-07 ENCOUNTER — TELEMEDICINE (OUTPATIENT)
Dept: FAMILY MEDICINE CLINIC | Facility: CLINIC | Age: 27
End: 2022-11-07

## 2022-11-07 DIAGNOSIS — J04.0 LARYNGITIS: ICD-10-CM

## 2022-11-07 DIAGNOSIS — J06.9 UPPER RESPIRATORY TRACT INFECTION, UNSPECIFIED TYPE: Primary | ICD-10-CM

## 2022-11-07 DIAGNOSIS — N76.0 ACUTE VAGINITIS: ICD-10-CM

## 2022-11-07 PROCEDURE — 99213 OFFICE O/P EST LOW 20 MIN: CPT | Performed by: PHYSICIAN ASSISTANT

## 2022-11-07 RX ORDER — GUAIFENESIN 200 MG/10ML
400 LIQUID ORAL 3 TIMES DAILY PRN
Qty: 236 ML | Refills: 0 | Status: SHIPPED | OUTPATIENT
Start: 2022-11-07

## 2022-11-07 RX ORDER — DEXTROMETHORPHAN HYDROBROMIDE AND PROMETHAZINE HYDROCHLORIDE 15; 6.25 MG/5ML; MG/5ML
5 SYRUP ORAL 4 TIMES DAILY PRN
Qty: 180 ML | Refills: 0 | Status: SHIPPED | OUTPATIENT
Start: 2022-11-07

## 2022-11-07 NOTE — PROGRESS NOTES
CC  Flu or strep    HPI     Aury Marquis is a pleasant 27 y.o. female with a PMH significant for asthma who presents for a video visit. You have chosen to receive care through a telehealth visit.    Do you consent to use a video/audio connection for your medical care today? Yes.    Patient location: home.   Provider location: [facility] office.    The patient c/o 2-3 day history of cough and losing her voice. She has associated runny nose, yellow-green mucous. Usually symptoms go into bronchitis. She denies wheezing. She is always short of breath due to anxiety but this has not worsened. She has needed to use her rescue inhaler more since her symptoms started. She was recently exposed to her mother who was diagnosed with a viral illness; she tested negative for strep, influenza, and COVID.     She also reports several other complaints including vaginal itching for 3 weeks. She does admit to new sexual partners and does not have a gynecologist.     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    • Central nervous system dysfunction in     • Depression    • GERD (gastroesophageal reflux disease)    • Gum disease    • Panic disorder    • Scoliosis    • Tattoos    • Von Willebrand disease (HCC)    • Von Willebrand disease (HCC)        Past Surgical History:   Procedure Laterality Date   •  SECTION     • DENTAL PROCEDURE     • EAR TUBES     • TUBAL ABDOMINAL LIGATION      pt not sure       Family History   Problem Relation Age of Onset   • Hypertension Mother    • Stroke Mother    • Cancer Maternal Grandfather    • Ovarian cancer Paternal Grandmother    • Hypertension Paternal Grandfather        Social History     Socioeconomic History   • Marital status: Single   • Number of children: 3   • Years of education: High school   Tobacco Use   • Smoking status: Every Day     Packs/day: 1.00      Years: 6.00     Pack years: 6.00     Types: Cigarettes   • Smokeless tobacco: Never   Substance and Sexual Activity   • Alcohol use: Yes     Comment: daily   • Drug use: No   • Sexual activity: Defer       Allergies   Allergen Reactions   • Contrast Dye Shortness Of Breath   • Amicar [Aminocaproic Acid] Nausea And Vomiting     Happened as a baby, mother states I'm allergic   • Buspar [Buspirone] Hallucinations   • Cephalosporins Hives   • Ketamine Other (See Comments)     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   • Latex Hives   • Lexapro [Escitalopram Oxalate] Other (See Comments)     Partial paralysis   • Morphine Itching   • Pertussis Vaccines Other (See Comments)     Pt does not know what happened mother told me allergic   • Prozac [Fluoxetine Hcl] Other (See Comments)     Partial paralysis   • Rhogam [Rho (D) Immune Globulin] Other (See Comments)     tachycardia   • Risperidone Mental Status Change     Shaking, cold, freaks out   • Seroquel [Quetiapine Fumarate] Other (See Comments)     Becomes suicidal and sleeps a lot   • Tramadol Nausea And Vomiting   • Zoloft [Sertraline Hcl] Other (See Comments)     States can't remember might make me throw up   • Albuterol Anxiety     Increase heart rate       ROS    Review of Systems   Constitutional: Positive for chills.   HENT: Positive for congestion.    Respiratory: Positive for cough and shortness of breath. Negative for wheezing.    Cardiovascular: Positive for chest pain.   Genitourinary: Positive for vaginal discharge.   Musculoskeletal: Positive for myalgias.   Neurological: Positive for headache.       There were no vitals filed for this visit.  There is no height or weight on file to calculate BMI.      Current Outpatient Medications:   •  Cariprazine HCl (Vraylar) 3 MG capsule capsule, Take 1 capsule by mouth Daily., Disp: 30 capsule, Rfl: 11  •  Divalproex Sodium (Depakote Sprinkles) 125 MG capsule, Take 1 capsule by mouth 2 (Two) Times a Day., Disp:  60 capsule, Rfl: 0  •  guaifenesin (ROBITUSSIN) 100 MG/5ML liquid, Take 20 mL by mouth 3 (Three) Times a Day As Needed for Congestion., Disp: 236 mL, Rfl: 0  •  hydrOXYzine (ATARAX) 10 MG/5ML syrup, Take 25 mL by mouth 3 (Three) Times a Day., Disp: 2250 mL, Rfl: 11  •  ibuprofen (ADVIL,MOTRIN) 100 MG/5ML suspension, Take 20 mL by mouth Every 6 (Six) Hours As Needed for Mild Pain., Disp: 200 mL, Rfl: 0  •  lansoprazole (Prevacid) 30 MG capsule, Take 1 capsule by mouth Daily., Disp: 90 capsule, Rfl: 3  •  levalbuterol (XOPENEX HFA) 45 MCG/ACT inhaler, Inhale 1-2 puffs Every 4 (Four) Hours As Needed for Wheezing., Disp: 15 g, Rfl: 11  •  loratadine (Claritin Reditabs) 10 MG disintegrating tablet, Place 1 tablet on the tongue Daily., Disp: 90 tablet, Rfl: 3  •  PREDNISONE 20MG/ML SUSP, Take 2 mL by mouth Daily for 5 days., Disp: 10 mL, Rfl: 0  •  promethazine-dextromethorphan (PROMETHAZINE-DM) 6.25-15 MG/5ML syrup, Take 5 mL by mouth 4 (Four) Times a Day As Needed for Cough., Disp: 180 mL, Rfl: 0  •  venlafaxine (EFFEXOR) 37.5 MG tablet, Take 1 tablet by mouth 2 (Two) Times a Day., Disp: 60 tablet, Rfl: 0    PE    Physical Exam  Vitals reviewed.   HENT:      Mouth/Throat:      Comments: Voice is hoarse  Pulmonary:      Effort: Pulmonary effort is normal. No respiratory distress.   Neurological:      Mental Status: She is alert.   Psychiatric:         Mood and Affect: Mood normal.          A/P    Problem List Items Addressed This Visit    None  Visit Diagnoses     Upper respiratory tract infection, unspecified type    -  Primary  -Discussed likely viral etiology. The patient requested azithromycin. We discussed that this would not help a viral infection and may cause side-effects and antibiotic resistance.   -Recommended treatment with rx cough syrup, mucinex, and prednisone. She requests liquid form of medications due to a coking phobia. Rx's were sent to her [facility] pharmacy.   -Advised patient to come into the clinic  for evaluation if symptoms worsen or persist for more than 7-10 days.       Laryngitis        Acute vaginitis      -Counseled the patient to schedule an appointment with her PCP in the office for this complaint.  I will refer her to gynecology at her request due to her history of ovarian cyst.    Relevant Orders    Ambulatory Referral to Gynecology          Plan of care was reviewed with patient at the conclusion of today's visit. Education was provided regarding diagnoses, management, prescribed or recommended OTC products, and the importance of compliance with follow-up appointments. The patient was counseled regarding the risks, benefits, and possible side-effects of treatment. I advised the patient to keep me informed of any acute changes in their status including new, worsening, or persistent symptoms. Patient expresses understanding and agreement with the management plan.        EDISON Knapp

## 2022-11-09 ENCOUNTER — TELEPHONE (OUTPATIENT)
Dept: FAMILY MEDICINE CLINIC | Facility: CLINIC | Age: 27
End: 2022-11-09

## 2022-11-09 RX ORDER — PREDNISONE INTENSOL 5 MG/ML
40 SOLUTION, CONCENTRATE ORAL DAILY
Qty: 40 ML | Refills: 0 | Status: SHIPPED | OUTPATIENT
Start: 2022-11-09 | End: 2022-11-09

## 2022-11-09 RX ORDER — PREDNISONE INTENSOL 5 MG/ML
40 SOLUTION, CONCENTRATE ORAL DAILY
Qty: 40 ML | Refills: 0 | Status: SHIPPED | OUTPATIENT
Start: 2022-11-09 | End: 2022-11-14

## 2022-11-11 ENCOUNTER — PRIOR AUTHORIZATION (OUTPATIENT)
Dept: FAMILY MEDICINE CLINIC | Facility: CLINIC | Age: 27
End: 2022-11-11

## 2022-11-11 NOTE — TELEPHONE ENCOUNTER
Received communication that Predisone Concentrate is not covered by pt's plan. Plan prefers prednisone, prednisolone, or prednisolone sodium phosphate.     Change or PA?

## 2022-11-21 ENCOUNTER — PATIENT MESSAGE (OUTPATIENT)
Dept: FAMILY MEDICINE CLINIC | Facility: CLINIC | Age: 27
End: 2022-11-21

## 2022-11-23 ENCOUNTER — OFFICE VISIT (OUTPATIENT)
Dept: OBSTETRICS AND GYNECOLOGY | Facility: CLINIC | Age: 27
End: 2022-11-23

## 2022-11-23 ENCOUNTER — TELEPHONE (OUTPATIENT)
Dept: OBSTETRICS AND GYNECOLOGY | Facility: CLINIC | Age: 27
End: 2022-11-23

## 2022-11-23 VITALS
SYSTOLIC BLOOD PRESSURE: 96 MMHG | DIASTOLIC BLOOD PRESSURE: 60 MMHG | BODY MASS INDEX: 18.71 KG/M2 | HEIGHT: 64 IN | WEIGHT: 109.6 LBS

## 2022-11-23 DIAGNOSIS — N76.0 BV (BACTERIAL VAGINOSIS): ICD-10-CM

## 2022-11-23 DIAGNOSIS — D68.00 VON WILLEBRAND DISEASE: Primary | ICD-10-CM

## 2022-11-23 DIAGNOSIS — N92.6 IRREGULAR PERIODS: ICD-10-CM

## 2022-11-23 DIAGNOSIS — R10.2 PELVIC PAIN: ICD-10-CM

## 2022-11-23 DIAGNOSIS — B96.89 BV (BACTERIAL VAGINOSIS): ICD-10-CM

## 2022-11-23 DIAGNOSIS — Z01.419 WOMEN'S ANNUAL ROUTINE GYNECOLOGICAL EXAMINATION: Primary | ICD-10-CM

## 2022-11-23 PROCEDURE — 3008F BODY MASS INDEX DOCD: CPT | Performed by: OBSTETRICS & GYNECOLOGY

## 2022-11-23 PROCEDURE — 99385 PREV VISIT NEW AGE 18-39: CPT | Performed by: OBSTETRICS & GYNECOLOGY

## 2022-11-23 PROCEDURE — 2014F MENTAL STATUS ASSESS: CPT | Performed by: OBSTETRICS & GYNECOLOGY

## 2022-11-23 RX ORDER — METRONIDAZOLE 7.5 MG/G
1 GEL VAGINAL DAILY
Qty: 70 G | Refills: 1 | Status: SHIPPED | OUTPATIENT
Start: 2022-11-23

## 2022-11-23 RX ORDER — METRONIDAZOLE 7.5 MG/G
GEL VAGINAL DAILY
Qty: 70 G | Refills: 1 | Status: SHIPPED | OUTPATIENT
Start: 2022-11-23 | End: 2022-11-23

## 2022-11-23 NOTE — TELEPHONE ENCOUNTER
PT STATES SHE WAS INSTRUCTED BY DR MCDONOUGH SHE NEEDS TO SEE A HEMATOLOGIST BEFORE SHE CAN HAVE HER PROCEDURE DONE. PT WANTING TO KNOW IF SHE CAN BE COORDINATED WITH A HEMATOLOGIST WITHIN [facility]. PLEASE ADVISE PT, SHE CAN BE REACHED ANYTIME.

## 2022-11-23 NOTE — TELEPHONE ENCOUNTER
Per CBF's note from today. She would need to see hematology for consult prior to surgery, Will place referral so she can stay in [facility]

## 2022-11-23 NOTE — PROGRESS NOTES
Gynecologic Annual Exam Note        Gynecologic Exam (annual)        Subjective     HPI  Aury Marquis is a 27 y.o.  female who presents for annual well woman exam as a new patient. There were no changes to her medical or surgical history since her last visit.. Patient reports problems with: vaginal itching, foul odor, and a thick yellow/green discharge . Patient's last menstrual period was 2022.. Her periods are regular every 25-35 days, lasting 5-6 days. The flow is moderate. Dysmenorrhea:mild, occurring premenstrually and first 1-2 days of flow. Patient reports that she has had BTB for the past month. Partner Status: Marital Status:  and .  She is sexually active. She has not had new partners.. STD testing recommendations have been explained to the patient and she does desire STD testing.    Patient reports that she has a hx of left ovarian cysts. Patient states that she had an TVUS at  and she was told that her cysts had ruptured and that the clip on her left fallopian tube is misplaced.     Additional OB/GYN History   Current contraception: contraceptive methods: Tubal ligation  Desires to: continue contraception  Thromboembolic Disease: personal history-Type I Von WilleBrands Disease  Age of menarche: 13    History of STD: yes HSV    Last Pap : . Results: unknown . HPV: unknown  Patient states that it was abnormal & she was suppose to go back for a repeat, but did not.  Last Completed Pap Smear     This patient has no relevant Health Maintenance data.           History of abnormal Pap smear: yes -   Gardasil status:refuses  Family history of uterine, colon, breast, or ovarian cancer: yes - Ovarian Ca- M & MGM  Performs monthly Self-Breast Exam: yes  Exercises Regularly:yes  Feelings of Anxiety or Depression: yes - pt sees a therapist & is medicated  Tobacco Usage?: Yes Aury Marquis  reports that she has been smoking cigarettes. She has a 4.00 pack-year smoking  history. She has never used smokeless tobacco.. I have educated her on the risk of diseases from using tobacco products such as cancer, COPD and heart disease.     I advised her to quit and she is not willing to quit.    I spent 3  minutes counseling the patient.            Current Outpatient Medications:   •  metroNIDAZOLE (METROGEL VAGINAL) 0.75 % vaginal gel, Insert 1 application into the vagina Daily., Disp: 70 g, Rfl: 1  •  Cariprazine HCl (Vraylar) 3 MG capsule capsule, Take 1 capsule by mouth Daily., Disp: 30 capsule, Rfl: 11  •  Divalproex Sodium (Depakote Sprinkles) 125 MG capsule, Take 1 capsule by mouth 2 (Two) Times a Day., Disp: 60 capsule, Rfl: 0  •  guaifenesin (ROBITUSSIN) 100 MG/5ML liquid, Take 20 mL by mouth 3 (Three) Times a Day As Needed for Congestion., Disp: 236 mL, Rfl: 0  •  hydrOXYzine (ATARAX) 10 MG/5ML syrup, Take 25 mL by mouth 3 (Three) Times a Day., Disp: 2250 mL, Rfl: 11  •  ibuprofen (ADVIL,MOTRIN) 100 MG/5ML suspension, Take 20 mL by mouth Every 6 (Six) Hours As Needed for Mild Pain., Disp: 200 mL, Rfl: 0  •  lansoprazole (Prevacid) 30 MG capsule, Take 1 capsule by mouth Daily., Disp: 90 capsule, Rfl: 3  •  levalbuterol (XOPENEX HFA) 45 MCG/ACT inhaler, Inhale 1-2 puffs Every 4 (Four) Hours As Needed for Wheezing., Disp: 15 g, Rfl: 11  •  loratadine (Claritin Reditabs) 10 MG disintegrating tablet, Place 1 tablet on the tongue Daily., Disp: 90 tablet, Rfl: 3  •  promethazine-dextromethorphan (PROMETHAZINE-DM) 6.25-15 MG/5ML syrup, Take 5 mL by mouth 4 (Four) Times a Day As Needed for Cough., Disp: 180 mL, Rfl: 0  •  venlafaxine (EFFEXOR) 37.5 MG tablet, Take 1 tablet by mouth 2 (Two) Times a Day., Disp: 60 tablet, Rfl: 0     Patient denies the need for medication refills today.    OB History        6    Para   4    Term           4    AB   2    Living   4       SAB   2    IAB        Ectopic        Molar        Multiple        Live Births   4                Health  Maintenance   Topic Date Due   • Annual Gynecologic Pelvic and Breast Exam  Never done   • COVID-19 Vaccine (1) Never done   • ANNUAL PHYSICAL  Never done   • Pneumococcal Vaccine 0-64 (1 - PCV) Never done   • TDAP/TD VACCINES (2 - Td or Tdap) 2017   • PAP SMEAR  Never done   • INFLUENZA VACCINE  Never done   • HEPATITIS C SCREENING  Completed       Past Medical History:   Diagnosis Date   • Abnormal Pap smear of cervix    • Adjustment reaction    • Anxiety    • Asthma    • 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   • Central nervous system dysfunction in     • Depression    • GERD (gastroesophageal reflux disease)    • Gum disease    • Hard to intubate Age 4    Hard to get put out.   • Herpes    • Migraine Not sure   • Ovarian cyst    • Panic disorder    • PTSD (post-traumatic stress disorder)    • Recurrent pregnancy loss, antepartum condition or complication    • Scoliosis    • Self-mutilation    • Tattoos    • Trauma Age 4   • Urinary tract infection Occasionally   • Von Willebrand disease    • Von Willebrand disease         Past Surgical History:   Procedure Laterality Date   •  SECTION     •  SECTION WITH TUBAL  2016    All I have is the clamps on my tubes.   • DENTAL PROCEDURE     • EAR TUBES     • TUBAL ABDOMINAL LIGATION      pt not sure       The additional following portions of the patient's history were reviewed and updated as appropriate: allergies, current medications, past family history, past medical history, past social history, past surgical history and problem list.    Review of Systems   Constitutional: Negative.    HENT: Negative.    Eyes: Negative.    Respiratory: Negative.    Cardiovascular: Negative.    Gastrointestinal: Negative.    Endocrine: Negative.    Genitourinary: Positive for menstrual problem, pelvic pain and vaginal discharge. Negative  "for breast discharge, breast lump and breast pain.        Vaginal odor.   Musculoskeletal: Negative.    Skin: Negative.    Allergic/Immunologic: Negative.    Neurological: Negative.    Hematological: Negative.    Psychiatric/Behavioral: Negative.          I have reviewed and agree with the HPI, ROS, and historical information as entered above. Mayank Roberts MD        Objective   BP 96/60   Ht 161.3 cm (63.5\")   Wt 49.7 kg (109 lb 9.6 oz)   LMP 11/09/2022   BMI 19.11 kg/m²     Physical Exam  Vitals reviewed. Exam conducted with a chaperone present.   Constitutional:       Appearance: Normal appearance. She is normal weight.   HENT:      Head: Normocephalic and atraumatic.   Cardiovascular:      Rate and Rhythm: Normal rate and regular rhythm.   Pulmonary:      Effort: Pulmonary effort is normal.      Breath sounds: Normal breath sounds.   Chest:   Breasts:     Right: Normal.      Left: Normal.   Abdominal:      General: Abdomen is flat. Bowel sounds are normal.      Palpations: Abdomen is soft.   Genitourinary:     General: Normal vulva.      Vagina: Vaginal discharge present.      Cervix: Normal.      Uterus: Normal.       Adnexa: Right adnexa normal and left adnexa normal.   Musculoskeletal:      Cervical back: Neck supple.   Skin:     General: Skin is warm and dry.   Neurological:      Mental Status: She is alert and oriented to person, place, and time.   Psychiatric:         Mood and Affect: Mood normal.         Behavior: Behavior normal.            Assessment and Plan    Problem List Items Addressed This Visit        Gastrointestinal Abdominal     Pelvic pain   Other Visit Diagnoses     Women's annual routine gynecological examination    -  Primary    Relevant Orders    LIQUID-BASED PAP SMEAR, [facility] ([facility])    BV (bacterial vaginosis)        Relevant Medications    metroNIDAZOLE (METROGEL VAGINAL) 0.75 % vaginal gel    Irregular periods        Relevant Orders    HCG, B-subunit, Quantitative "    Von Willebrand Factor Activity          1. GYN annual well woman exam.   2. Reviewed pap guidelines.   3. Encouraged use of condoms for STD prevention.  4. OCP's/Vaginal Ring - Discussed side effects of nausea, BTB, headaches, breast tenderness and slight weight gain in the first three cycles.  Understands risks of blood clots, stroke, and theoretical risk of breast cancer.  Denies family history of blood clots.  5. Reviewed monthly self breast exams.  Instructed to call with lumps, pain, or breast discharge.    6. Reviewed exercise as a preventative health measures.   7. Reccommended Flu Vaccine in Fall of each year.  Return in about 20 days (around 12/13/2022) for for preop visit. Due to persistent pelvic pain after tubal ligation, she would like further evaluation by means of dx laparoscopy with removal of tubal clips. Will need to f/u with hematologist prior to surgery due to reported VWD.      Mayank Roberts MD  11/23/2022

## 2022-11-24 LAB — HCG INTACT+B SERPL-ACNC: <1 MIU/ML

## 2022-11-26 LAB — VWF:RCO ACT/NOR PPP PL AGG: 46 % (ref 50–200)

## 2022-11-27 NOTE — TELEPHONE ENCOUNTER
Patient will need to be seen in the office for this issue before any further orders can be placed.

## 2022-11-28 LAB — REF LAB TEST METHOD: NORMAL

## 2022-11-28 NOTE — PROGRESS NOTES
Von Willebrand factor is low. Make sure she has hematology referral. I think we sent one last week.

## 2022-11-30 ENCOUNTER — TELEPHONE (OUTPATIENT)
Dept: OBSTETRICS AND GYNECOLOGY | Facility: CLINIC | Age: 27
End: 2022-11-30

## 2022-11-30 NOTE — TELEPHONE ENCOUNTER
Caller: Aury Marquis    Relationship: Self    Best call back number:     What is the best time to reach you: ANYTIME     Who are you requesting to speak with (clinical staff, provider,  specific staff member): CLINICAL    Do you know the name of the person who called: KERA    What was the call regarding:     Do you require a callback: YES, HUB UNABLE TO WARM TRANSFER

## 2022-11-30 NOTE — TELEPHONE ENCOUNTER
Dr. Roberts patient    S/w patient- informed patient that Dr. Roberts has referred her to hematology for VWD. Informed patient that she will receive a call from our referral coordinator with appointment time and date. She v/u.

## 2025-04-20 ENCOUNTER — READMISSION MANAGEMENT (OUTPATIENT)
Dept: CALL CENTER | Facility: HOSPITAL | Age: 30
End: 2025-04-20
Payer: MEDICAID

## 2025-04-20 NOTE — OUTREACH NOTE
Prep Survey      Flowsheet Row Responses   [facility] facility patient discharged from? Non-BH   Is LACE score < 7 ? Non-BH Discharge   Eligibility Not Eligible   What are the reasons patient is not eligible? Behavioral Health  [SI]   Does the patient have one of the following disease processes/diagnoses(primary or secondary)? Other   Prep survey completed? Yes            Hanna Lam Registered Nurse